# Patient Record
Sex: FEMALE | Race: WHITE | NOT HISPANIC OR LATINO
[De-identification: names, ages, dates, MRNs, and addresses within clinical notes are randomized per-mention and may not be internally consistent; named-entity substitution may affect disease eponyms.]

---

## 2022-05-26 PROBLEM — Z00.00 ENCOUNTER FOR PREVENTIVE HEALTH EXAMINATION: Status: ACTIVE | Noted: 2022-05-26

## 2022-06-17 ENCOUNTER — NON-APPOINTMENT (OUTPATIENT)
Age: 22
End: 2022-06-17

## 2022-06-22 ENCOUNTER — APPOINTMENT (OUTPATIENT)
Dept: OBGYN | Facility: CLINIC | Age: 22
End: 2022-06-22
Payer: COMMERCIAL

## 2022-06-22 VITALS
BODY MASS INDEX: 29.39 KG/M2 | WEIGHT: 140 LBS | DIASTOLIC BLOOD PRESSURE: 68 MMHG | SYSTOLIC BLOOD PRESSURE: 122 MMHG | HEIGHT: 58 IN

## 2022-06-22 PROCEDURE — 99203 OFFICE O/P NEW LOW 30 MIN: CPT

## 2022-06-22 NOTE — REVIEW OF SYSTEMS
[Patient Intake Form Reviewed] : Patient intake form was reviewed [Abn Vaginal bleeding] : abnormal vaginal bleeding [Negative] : Heme/Lymph [de-identified] : increased hair growth on chin and facial acne

## 2022-06-22 NOTE — COUNSELING
[Nutrition/ Exercise/ Weight Management] : nutrition, exercise, weight management [Drugs/Alcohol] : drugs, alcohol [Contraception/ Emergency Contraception/ Safe Sexual Practices] : contraception, emergency contraception, safe sexual practices [Confidentiality] : confidentiality [STD (testing, results, tx)] : STD (testing, results, tx) [Lab Results] : lab results [Other ___] : [unfilled]

## 2022-06-22 NOTE — PLAN
[FreeTextEntry1] : 20 y/o G0 new pt  c/o Hormonal Acre, Irregular Cycles and Hirsutism; Symptoms c/w PCOS (we discussed need for blood work/sono- offered not indicated at this time)\par - Discussed at length PCOS as a syndrome; it's relation to insulin resistance, potential impact on infertility and increased risk of endometrial cancer with unopposed estrogen. A valuation of bloods for FSH, LH, TSH, Free Testosterone, 17-0H Progesterone, and possible glusose tolerance testing were otulined. Discussed metobolic syndrome and importance of weight mgmt and diet modification. Treatment options discussed including OCPs, Provera, Mirena IUD, and Metformin. The GI effects of metformin were discussed. All questions and concerns were addressed.\par -erx Spirinolactone 25 mg po daily for acne/hair growth\par -erx JOSE F ocp to help regulate menses; and also assist with acne/hair growth\par - d/w pt start pap when she becomes sexually active, no need at this time, s/p Gardasil\par \par follow up 6 months to eval meds. \par \par During this visit 30 minutes were spent face-to-face with greater than 50% of this time dedicated to counseling.\par \par \par \par

## 2022-06-22 NOTE — HISTORY OF PRESENT ILLNESS
[Frequency: Q ___ days] : menstrual periods occur approximately every [unfilled] days [Menarche Age: ____] : age at menarche was [unfilled] [No] : Patient does not have concerns regarding sex [Never active] : never active [TextBox_31] : does not need; never sexually active [TextBox_6] : 6/4/22 [TextBox_9] : 10 [TextBox_13] : 98-46 [TextBox_15] : 4-7 [Irregular Menstrual Interval] : irregular menstrual interval [Moderate Bleeding] : moderate bleeding [FreeTextEntry1] : 6/4/22

## 2022-06-22 NOTE — PHYSICAL EXAM
[Chaperone Declined] : Patient declined chaperone [Appropriately responsive] : appropriately responsive [Alert] : alert [No Acute Distress] : no acute distress [No Lymphadenopathy] : no lymphadenopathy

## 2022-06-22 NOTE — REASON FOR VISIT
[Initial] : an initial consultation for [Abnormal Uterine Bleeding] : abnormal uterine bleeding [Other: _____] : [unfilled] [FreeTextEntry2] : hormonal concerns [FreeTextEntry1] : PCP; [facility]

## 2022-10-03 RX ORDER — DROSPIRENONE AND ETHINYL ESTRADIOL 0.02-3(28)
3-0.02 KIT ORAL DAILY
Qty: 3 | Refills: 0 | Status: ACTIVE | COMMUNITY
Start: 2022-06-22 | End: 1900-01-01

## 2022-11-02 ENCOUNTER — NON-APPOINTMENT (OUTPATIENT)
Age: 22
End: 2022-11-02

## 2022-12-08 ENCOUNTER — APPOINTMENT (OUTPATIENT)
Dept: OBGYN | Facility: CLINIC | Age: 22
End: 2022-12-08
Payer: COMMERCIAL

## 2022-12-08 ENCOUNTER — TRANSCRIPTION ENCOUNTER (OUTPATIENT)
Age: 22
End: 2022-12-08

## 2022-12-08 VITALS
HEIGHT: 58 IN | BODY MASS INDEX: 27.92 KG/M2 | DIASTOLIC BLOOD PRESSURE: 79 MMHG | WEIGHT: 133 LBS | SYSTOLIC BLOOD PRESSURE: 109 MMHG

## 2022-12-08 DIAGNOSIS — E28.8 OTHER OVARIAN DYSFUNCTION: ICD-10-CM

## 2022-12-08 DIAGNOSIS — L70.0 ACNE VULGARIS: ICD-10-CM

## 2022-12-08 DIAGNOSIS — E28.2 POLYCYSTIC OVARIAN SYNDROME: ICD-10-CM

## 2022-12-08 DIAGNOSIS — N92.6 IRREGULAR MENSTRUATION, UNSPECIFIED: ICD-10-CM

## 2022-12-08 PROCEDURE — 99213 OFFICE O/P EST LOW 20 MIN: CPT

## 2022-12-08 NOTE — HISTORY OF PRESENT ILLNESS
[FreeTextEntry1] : 23 y/o G0 LMP 11/27/22 presents for follow up of oligomenorrhea, facial hair growth and acne. Most likely PCOS (no bloods or sono). Pt very happy with improvement in cane since starting spirinolactone 25mg , some improvement in chin hair growth. Reports that her cycles since starting JOSE F in June have been extremely regular and would like to continue. BP normal today. No other complaints or concerns. \par \par NKDA\par no med hx, no surgical hx\par Never sexually active. \par GYN: s/p Gardasil vaccine; PCOS

## 2022-12-08 NOTE — COUNSELING
[Contraception/ Emergency Contraception/ Safe Sexual Practices] : contraception, emergency contraception, safe sexual practices [Confidentiality] : confidentiality [Medication Management] : medication management

## 2022-12-08 NOTE — PHYSICAL EXAM
[Chaperone Declined] : Patient declined chaperone [Appropriately responsive] : appropriately responsive [Alert] : alert [No Acute Distress] : no acute distress [Oriented x3] : oriented x3

## 2023-01-16 ENCOUNTER — RX RENEWAL (OUTPATIENT)
Age: 23
End: 2023-01-16

## 2023-04-28 ENCOUNTER — RX RENEWAL (OUTPATIENT)
Age: 23
End: 2023-04-28

## 2023-04-28 RX ORDER — SPIRONOLACTONE 25 MG/1
25 TABLET ORAL DAILY
Qty: 90 | Refills: 0 | Status: ACTIVE | COMMUNITY
Start: 2022-06-22 | End: 1900-01-01

## 2023-05-12 NOTE — PLAN
[FreeTextEntry1] : follow up for PCOS symtoms\par -continue JOSE F, erx placed. Oral contraceptive counseling provided to the patient.  Discussed risks and benefits, including thromboembolic events, especially in the setting of smoking, or in the setting of pre-existing medical conditions that predispose to adverse cardiovascular events.  Benign side effects reviewed as well as risk of unintended pregnancy.   Discussion regarding decreased contraceptive efficacy when taken with certain medications or when dosing schedule is erratic.  They do not protect against STI's. All questions answered.\par -continue spirinolactone 25 mg; discussed with patient about possible increasing dose to 50mg if she notices increased acne/hair growth; also advised to f/u derm to consider topicals. \par \par RTO 1 yr or sooner as needed. \par pap when she becomes sexually active; aware she should have yearly wellness exams.\par \par During this visit 20 minutes was spent face-to-face with greater than 50% of the time dedicated to counseling.\par \par 
None

## 2023-06-05 RX ORDER — DROSPIRENONE AND ETHINYL ESTRADIOL 0.02-3(28)
3-0.02 KIT ORAL DAILY
Qty: 1 | Refills: 0 | Status: ACTIVE | COMMUNITY
Start: 2022-12-08 | End: 1900-01-01

## 2023-06-26 ENCOUNTER — APPOINTMENT (OUTPATIENT)
Dept: OBGYN | Facility: CLINIC | Age: 23
End: 2023-06-26

## 2024-05-06 ENCOUNTER — NON-APPOINTMENT (OUTPATIENT)
Age: 24
End: 2024-05-06

## 2024-06-10 ENCOUNTER — ASOB RESULT (OUTPATIENT)
Age: 24
End: 2024-06-10

## 2024-06-10 ENCOUNTER — APPOINTMENT (OUTPATIENT)
Dept: ANTEPARTUM | Facility: CLINIC | Age: 24
End: 2024-06-10
Payer: COMMERCIAL

## 2024-06-10 PROCEDURE — 76805 OB US >/= 14 WKS SNGL FETUS: CPT

## 2024-06-26 ENCOUNTER — APPOINTMENT (OUTPATIENT)
Dept: ANTEPARTUM | Facility: CLINIC | Age: 24
End: 2024-06-26
Payer: COMMERCIAL

## 2024-06-26 ENCOUNTER — ASOB RESULT (OUTPATIENT)
Age: 24
End: 2024-06-26

## 2024-06-26 PROCEDURE — 76816 OB US FOLLOW-UP PER FETUS: CPT

## 2024-08-28 ENCOUNTER — APPOINTMENT (OUTPATIENT)
Dept: ANTEPARTUM | Facility: CLINIC | Age: 24
End: 2024-08-28

## 2024-08-28 ENCOUNTER — ASOB RESULT (OUTPATIENT)
Age: 24
End: 2024-08-28

## 2024-08-28 PROCEDURE — 76816 OB US FOLLOW-UP PER FETUS: CPT

## 2024-10-28 ENCOUNTER — APPOINTMENT (OUTPATIENT)
Dept: ANTEPARTUM | Facility: CLINIC | Age: 24
End: 2024-10-28
Payer: COMMERCIAL

## 2024-10-28 ENCOUNTER — INPATIENT (INPATIENT)
Facility: HOSPITAL | Age: 24
LOS: 2 days | Discharge: HOME | DRG: 951 | End: 2024-10-31
Attending: OBSTETRICS & GYNECOLOGY | Admitting: OBSTETRICS & GYNECOLOGY
Payer: COMMERCIAL

## 2024-10-28 ENCOUNTER — ASOB RESULT (OUTPATIENT)
Age: 24
End: 2024-10-28

## 2024-10-28 VITALS
HEART RATE: 114 BPM | WEIGHT: 149.03 LBS | TEMPERATURE: 99 F | HEIGHT: 58 IN | OXYGEN SATURATION: 99 % | RESPIRATION RATE: 18 BRPM | DIASTOLIC BLOOD PRESSURE: 83 MMHG | SYSTOLIC BLOOD PRESSURE: 124 MMHG

## 2024-10-28 DIAGNOSIS — O26.899 OTHER SPECIFIED PREGNANCY RELATED CONDITIONS, UNSPECIFIED TRIMESTER: ICD-10-CM

## 2024-10-28 DIAGNOSIS — Z34.80 ENCOUNTER FOR SUPERVISION OF OTHER NORMAL PREGNANCY, UNSPECIFIED TRIMESTER: ICD-10-CM

## 2024-10-28 LAB
BASOPHILS # BLD AUTO: 0.04 K/UL — SIGNIFICANT CHANGE UP (ref 0–0.2)
BASOPHILS NFR BLD AUTO: 0.3 % — SIGNIFICANT CHANGE UP (ref 0–2)
BLD GP AB SCN SERPL QL: NEGATIVE — SIGNIFICANT CHANGE UP
EOSINOPHIL # BLD AUTO: 0.01 K/UL — SIGNIFICANT CHANGE UP (ref 0–0.5)
EOSINOPHIL NFR BLD AUTO: 0.1 % — SIGNIFICANT CHANGE UP (ref 0–6)
HCT VFR BLD CALC: 36 % — SIGNIFICANT CHANGE UP (ref 34.5–45)
HGB BLD-MCNC: 11.6 G/DL — SIGNIFICANT CHANGE UP (ref 11.5–15.5)
IMM GRANULOCYTES NFR BLD AUTO: 1.2 % — HIGH (ref 0–0.9)
LYMPHOCYTES # BLD AUTO: 25.6 % — SIGNIFICANT CHANGE UP (ref 13–44)
LYMPHOCYTES # BLD AUTO: 3.25 K/UL — SIGNIFICANT CHANGE UP (ref 1–3.3)
MCHC RBC-ENTMCNC: 28.2 PG — SIGNIFICANT CHANGE UP (ref 27–34)
MCHC RBC-ENTMCNC: 32.2 GM/DL — SIGNIFICANT CHANGE UP (ref 32–36)
MCV RBC AUTO: 87.4 FL — SIGNIFICANT CHANGE UP (ref 80–100)
MONOCYTES # BLD AUTO: 0.81 K/UL — SIGNIFICANT CHANGE UP (ref 0–0.9)
MONOCYTES NFR BLD AUTO: 6.4 % — SIGNIFICANT CHANGE UP (ref 2–14)
NEUTROPHILS # BLD AUTO: 8.44 K/UL — HIGH (ref 1.8–7.4)
NEUTROPHILS NFR BLD AUTO: 66.4 % — SIGNIFICANT CHANGE UP (ref 43–77)
NRBC # BLD: 0 /100 WBCS — SIGNIFICANT CHANGE UP (ref 0–0)
PLATELET # BLD AUTO: 212 K/UL — SIGNIFICANT CHANGE UP (ref 150–400)
RBC # BLD: 4.12 M/UL — SIGNIFICANT CHANGE UP (ref 3.8–5.2)
RBC # FLD: 13.4 % — SIGNIFICANT CHANGE UP (ref 10.3–14.5)
RH IG SCN BLD-IMP: POSITIVE — SIGNIFICANT CHANGE UP
WBC # BLD: 12.7 K/UL — HIGH (ref 3.8–10.5)
WBC # FLD AUTO: 12.7 K/UL — HIGH (ref 3.8–10.5)

## 2024-10-28 PROCEDURE — 76816 OB US FOLLOW-UP PER FETUS: CPT

## 2024-10-28 PROCEDURE — 76819 FETAL BIOPHYS PROFIL W/O NST: CPT | Mod: 59

## 2024-10-28 RX ORDER — OXYTOCIN IN D5W-0.2% SODIUM CL 15/250 ML
167 PLASTIC BAG, INJECTION (ML) INTRAVENOUS
Qty: 30 | Refills: 0 | Status: DISCONTINUED | OUTPATIENT
Start: 2024-10-28 | End: 2024-10-31

## 2024-10-28 RX ORDER — AMPICILLIN 2 G/1
1 INJECTION, POWDER, FOR SOLUTION INTRAVENOUS EVERY 4 HOURS
Refills: 0 | Status: DISCONTINUED | OUTPATIENT
Start: 2024-10-28 | End: 2024-10-29

## 2024-10-28 RX ORDER — CITRIC ACID/SODIUM CITRATE 334-500MG
15 SOLUTION, ORAL ORAL EVERY 6 HOURS
Refills: 0 | Status: DISCONTINUED | OUTPATIENT
Start: 2024-10-28 | End: 2024-10-29

## 2024-10-28 RX ORDER — CHLORHEXIDINE GLUCONATE 40 MG/ML
1 SOLUTION TOPICAL DAILY
Refills: 0 | Status: DISCONTINUED | OUTPATIENT
Start: 2024-10-28 | End: 2024-10-29

## 2024-10-28 RX ORDER — AMPICILLIN 2 G/1
2 INJECTION, POWDER, FOR SOLUTION INTRAVENOUS ONCE
Refills: 0 | Status: COMPLETED | OUTPATIENT
Start: 2024-10-28 | End: 2024-10-28

## 2024-10-28 RX ADMIN — Medication 125 MILLILITER(S): at 11:10

## 2024-10-28 RX ADMIN — AMPICILLIN 108 GRAM(S): 2 INJECTION, POWDER, FOR SOLUTION INTRAVENOUS at 15:23

## 2024-10-28 RX ADMIN — Medication 1000 MILLILITER(S): at 10:58

## 2024-10-28 RX ADMIN — AMPICILLIN 108 GRAM(S): 2 INJECTION, POWDER, FOR SOLUTION INTRAVENOUS at 23:22

## 2024-10-28 RX ADMIN — AMPICILLIN 200 GRAM(S): 2 INJECTION, POWDER, FOR SOLUTION INTRAVENOUS at 11:14

## 2024-10-28 RX ADMIN — AMPICILLIN 108 GRAM(S): 2 INJECTION, POWDER, FOR SOLUTION INTRAVENOUS at 19:20

## 2024-10-28 NOTE — OB PROVIDER LABOR PROGRESS NOTE - NS_SUBJECTIVE/OBJECTIVE_OBGYN_ALL_OB_FT
PA Note:  Patient seen and evaluated at bedside for placement of cervical balloon.    Cervical balloon placed without incident. 30cc of NS filled uterine balloon.

## 2024-10-28 NOTE — OB PROVIDER LABOR PROGRESS NOTE - ASSESSMENT
A/P:  -EFM/[place]  -Continue IOL with buccal cytotec and CB  -C/w Ampicillin for gbs ppx  -Anticipate   Plan per Dr. Kendrick Shi PA-C

## 2024-10-28 NOTE — OB PROVIDER H&P - NSLOWPPHRISK_OBGYN_A_OB
No previous uterine incision/[place] Pregnancy/Less than or equal to 4 previous vaginal births/No known bleeding disorder/No history of postpartum hemorrhage/No other PPH risks indicated

## 2024-10-28 NOTE — OB PROVIDER H&P - HISTORY OF PRESENT ILLNESS
PA Note:  24y  @40wks and 3 days gestation presenting for IOL due to oligohydramnios with an HEAVEN 4.3 found on ultrasound today. Patient denies ctx, LOF or VB. PNC c/b IUGR also noted on ultrasound today with EFW 6#6 (overall 4%ile, AC <1%ile, dopplers WNL), otherwise uncomplicated. +FM. GBS +.     POBHx: Denies  PGYNhx: Denies fibroids, ovarian cysts, abnormal pap smears, STD's  PMHx: Denies  Medications: PNV  Allergies: NKDA  PSHx: Denies  Social Hx: Denies etoh/tobacco/drug use. Denies anxiety/depression    Vital Signs Last 24 Hrs  T(C): 37.1 (28 Oct 2024 10:24), Max: 37.1 (28 Oct 2024 10:24)  T(F): 98.8 (28 Oct 2024 10:24), Max: 98.8 (28 Oct 2024 10:24)  HR: 114 (28 Oct 2024 10:24) (114 - 114)  BP: 124/83 (28 Oct 2024 10:24) (124/83 - 124/83)  BP(mean): --  RR: 18 (28 Oct 2024 10:24) (18 - 18)  SpO2: 99% (28 Oct 2024 10:24) (99% - 99%)    Parameters below as of 28 Oct 2024 10:24  Patient On (Oxygen Delivery Method): room air    General: NAD, A&Ox3  CV: RRR  Lungs: CTA b/l  Abdomen: Soft, NT, gravid    VE: /-3 (examined by Dr. Marks in office)  Bedside sono: vertex presentation  EFM: 145/moderate variability/+accels/no decels  [place]: Irregular

## 2024-10-28 NOTE — OB RN PATIENT PROFILE - FLU SEASON?
Yes... Sotyktu Counseling:  I discussed the most common side effects of Sotyktu including: common cold, sore throat, sinus infections, cold sores, canker sores, folliculitis, and acne.  I also discussed more serious side effects of Sotyktu including but not limited to: serious allergic reactions; increased risk for infections such as TB; cancers such as lymphomas; rhabdomyolysis and elevated CPK; and elevated triglycerides and liver enzymes.

## 2024-10-28 NOTE — OB PROVIDER H&P - NSHPPHYSICALEXAM_GEN_ALL_CORE
Vital Signs Last 24 Hrs  T(C): 37.1 (28 Oct 2024 10:24), Max: 37.1 (28 Oct 2024 10:24)  T(F): 98.8 (28 Oct 2024 10:24), Max: 98.8 (28 Oct 2024 10:24)  HR: 114 (28 Oct 2024 10:24) (114 - 114)  BP: 124/83 (28 Oct 2024 10:24) (124/83 - 124/83)  BP(mean): --  RR: 18 (28 Oct 2024 10:24) (18 - 18)  SpO2: 99% (28 Oct 2024 10:24) (99% - 99%)    Parameters below as of 28 Oct 2024 10:24  Patient On (Oxygen Delivery Method): room air    General: NAD, A&Ox3  CV: RRR  Lungs: CTA b/l  Abdomen: Soft, NT, gravid

## 2024-10-28 NOTE — OB PROVIDER H&P - ASSESSMENT
A/P:  24y  @40wks and 3 days gestation presenting for IOL for oligohydramnios (HEAVEN 4.3) and IUGR (overall 4%ile). +FM. GBS +. EFW [id number]  -Admit to L&D  -Routine labs  -EFM/[place]  -NPO, IVF, Bicitra  -IOL with buccal cytotec and cervical balloon  -Ampicillin for gbs ppx  -Anesthesia consult  -Anticipate   Plan per Dr. Kendrick Shi PA-C

## 2024-10-29 LAB — T PALLIDUM AB TITR SER: NEGATIVE — SIGNIFICANT CHANGE UP

## 2024-10-29 PROCEDURE — 88307 TISSUE EXAM BY PATHOLOGIST: CPT | Mod: 26

## 2024-10-29 RX ORDER — MAGNESIUM HYDROXIDE 1200 MG/15ML
30 SUSPENSION ORAL
Refills: 0 | Status: DISCONTINUED | OUTPATIENT
Start: 2024-10-29 | End: 2024-10-31

## 2024-10-29 RX ORDER — IBUPROFEN 200 MG
600 TABLET ORAL EVERY 6 HOURS
Refills: 0 | Status: DISCONTINUED | OUTPATIENT
Start: 2024-10-29 | End: 2024-10-29

## 2024-10-29 RX ORDER — MODIFIED LANOLIN
1 OINTMENT (GRAM) TOPICAL EVERY 6 HOURS
Refills: 0 | Status: DISCONTINUED | OUTPATIENT
Start: 2024-10-29 | End: 2024-10-31

## 2024-10-29 RX ORDER — IBUPROFEN 200 MG
600 TABLET ORAL EVERY 6 HOURS
Refills: 0 | Status: COMPLETED | OUTPATIENT
Start: 2024-10-29 | End: 2025-09-27

## 2024-10-29 RX ORDER — OXYTOCIN IN D5W-0.2% SODIUM CL 15/250 ML
167 PLASTIC BAG, INJECTION (ML) INTRAVENOUS
Qty: 30 | Refills: 0 | Status: DISCONTINUED | OUTPATIENT
Start: 2024-10-29 | End: 2024-10-31

## 2024-10-29 RX ORDER — SIMETHICONE 80 MG/1
80 TABLET, CHEWABLE ORAL EVERY 4 HOURS
Refills: 0 | Status: DISCONTINUED | OUTPATIENT
Start: 2024-10-29 | End: 2024-10-31

## 2024-10-29 RX ORDER — CLOSTRIDIUM TETANI TOXOID ANTIGEN (FORMALDEHYDE INACTIVATED), CORYNEBACTERIUM DIPHTHERIAE TOXOID ANTIGEN (FORMALDEHYDE INACTIVATED), BORDETELLA PERTUSSIS TOXOID ANTIGEN (GLUTARALDEHYDE INACTIVATED), BORDETELLA PERTUSSIS FILAMENTOUS HEMAGGLUTININ ANTIGEN (FORMALDEHYDE INACTIVATED), BORDETELLA PERTUSSIS PERTACTIN ANTIGEN, AND BORDETELLA PERTUSSIS FIMBRIAE 2/3 ANTIGEN 5; 2; 2.5; 5; 3; 5 [LF]/.5ML; [LF]/.5ML; UG/.5ML; UG/.5ML; UG/.5ML; UG/.5ML
0.5 INJECTION, SUSPENSION INTRAMUSCULAR ONCE
Refills: 0 | Status: DISCONTINUED | OUTPATIENT
Start: 2024-10-29 | End: 2024-10-31

## 2024-10-29 RX ORDER — SODIUM CHLORIDE 9 MG/ML
3 INJECTION, SOLUTION INTRAMUSCULAR; INTRAVENOUS; SUBCUTANEOUS EVERY 8 HOURS
Refills: 0 | Status: DISCONTINUED | OUTPATIENT
Start: 2024-10-29 | End: 2024-10-31

## 2024-10-29 RX ORDER — BENZOCAINE 200 MG/G
1 GEL ORAL EVERY 6 HOURS
Refills: 0 | Status: DISCONTINUED | OUTPATIENT
Start: 2024-10-29 | End: 2024-10-31

## 2024-10-29 RX ORDER — TRANEXAMIC ACID 650 MG/1
1000 TABLET ORAL ONCE
Refills: 0 | Status: COMPLETED | OUTPATIENT
Start: 2024-10-29 | End: 2024-10-29

## 2024-10-29 RX ORDER — HYDROCORTISONE 1 %
1 OINTMENT (GRAM) TOPICAL EVERY 6 HOURS
Refills: 0 | Status: DISCONTINUED | OUTPATIENT
Start: 2024-10-29 | End: 2024-10-31

## 2024-10-29 RX ORDER — DIBUCAINE 1 %
1 OINTMENT (GRAM) TOPICAL EVERY 6 HOURS
Refills: 0 | Status: DISCONTINUED | OUTPATIENT
Start: 2024-10-29 | End: 2024-10-31

## 2024-10-29 RX ORDER — IBUPROFEN 200 MG
600 TABLET ORAL EVERY 6 HOURS
Refills: 0 | Status: DISCONTINUED | OUTPATIENT
Start: 2024-10-29 | End: 2024-10-31

## 2024-10-29 RX ORDER — KETOROLAC TROMETHAMINE 30 MG/ML
30 INJECTION INTRAMUSCULAR; INTRAVENOUS ONCE
Refills: 0 | Status: DISCONTINUED | OUTPATIENT
Start: 2024-10-29 | End: 2024-10-29

## 2024-10-29 RX ORDER — ACETAMINOPHEN 500 MG
975 TABLET ORAL EVERY 6 HOURS
Refills: 0 | Status: DISCONTINUED | OUTPATIENT
Start: 2024-10-29 | End: 2024-10-31

## 2024-10-29 RX ORDER — DIPHENHYDRAMINE HCL 12.5MG/5ML
25 ELIXIR ORAL EVERY 6 HOURS
Refills: 0 | Status: DISCONTINUED | OUTPATIENT
Start: 2024-10-29 | End: 2024-10-31

## 2024-10-29 RX ORDER — ACETAMINOPHEN 500 MG
975 TABLET ORAL
Refills: 0 | Status: DISCONTINUED | OUTPATIENT
Start: 2024-10-29 | End: 2024-10-29

## 2024-10-29 RX ORDER — PRENATAL VIT/IRON FUM/FOLIC AC 60 MG-1 MG
1 TABLET ORAL DAILY
Refills: 0 | Status: DISCONTINUED | OUTPATIENT
Start: 2024-10-29 | End: 2024-10-31

## 2024-10-29 RX ORDER — PRAMOXINE HCL 1 %
1 CREAM (GRAM) RECTAL EVERY 4 HOURS
Refills: 0 | Status: DISCONTINUED | OUTPATIENT
Start: 2024-10-29 | End: 2024-10-31

## 2024-10-29 RX ORDER — OXYTOCIN IN D5W-0.2% SODIUM CL 15/250 ML
4 PLASTIC BAG, INJECTION (ML) INTRAVENOUS
Qty: 30 | Refills: 0 | Status: DISCONTINUED | OUTPATIENT
Start: 2024-10-29 | End: 2024-10-29

## 2024-10-29 RX ORDER — OXYCODONE HYDROCHLORIDE 30 MG/1
5 TABLET ORAL
Refills: 0 | Status: DISCONTINUED | OUTPATIENT
Start: 2024-10-29 | End: 2024-10-31

## 2024-10-29 RX ORDER — OXYCODONE HYDROCHLORIDE 30 MG/1
5 TABLET ORAL ONCE
Refills: 0 | Status: DISCONTINUED | OUTPATIENT
Start: 2024-10-29 | End: 2024-10-31

## 2024-10-29 RX ADMIN — Medication 975 MILLIGRAM(S): at 12:39

## 2024-10-29 RX ADMIN — TRANEXAMIC ACID 220 MILLIGRAM(S): 650 TABLET ORAL at 06:35

## 2024-10-29 RX ADMIN — KETOROLAC TROMETHAMINE 30 MILLIGRAM(S): 30 INJECTION INTRAMUSCULAR; INTRAVENOUS at 07:52

## 2024-10-29 RX ADMIN — Medication 600 MILLIGRAM(S): at 15:50

## 2024-10-29 RX ADMIN — Medication 1000 MILLILITER(S): at 03:40

## 2024-10-29 RX ADMIN — Medication 975 MILLIGRAM(S): at 23:53

## 2024-10-29 RX ADMIN — AMPICILLIN 108 GRAM(S): 2 INJECTION, POWDER, FOR SOLUTION INTRAVENOUS at 03:20

## 2024-10-29 RX ADMIN — Medication 600 MILLIGRAM(S): at 21:00

## 2024-10-29 RX ADMIN — Medication 600 MILLIGRAM(S): at 14:52

## 2024-10-29 RX ADMIN — Medication 975 MILLIGRAM(S): at 18:17

## 2024-10-29 RX ADMIN — Medication 600 MILLIGRAM(S): at 20:26

## 2024-10-29 RX ADMIN — Medication 4 MILLIUNIT(S)/MIN: at 04:39

## 2024-10-29 RX ADMIN — Medication 975 MILLIGRAM(S): at 13:35

## 2024-10-29 NOTE — OB RN DELIVERY SUMMARY - NS_SEPSISRSKCALC_OBGYN_ALL_OB_FT
EOS calculated successfully. EOS Risk Factor: 0.5/1000 live births ([facility][note 3] national incidence); GA=40w4d; Temp=98.8; ROM=1.667; GBS='Positive'; Antibiotics='Broad spectrum antibiotics 2-3.9 hrs prior to birth'

## 2024-10-29 NOTE — OB NEONATOLOGY/PEDIATRICIAN DELIVERY SUMMARY - NSPEDSNEONOTESA_OBGYN_ALL_OB_FT
Requested to attend [facility] for a non reassuring fetal heart tracing at 40 4/7 weeks gestation. Mother is a 24 year old  with prenatal labs neg, NR and immune, GBS pos and treated with Amp x 5, blood type O pos. This was an induction of labor for oligohydramnios and IUGR. SROM 1 hour prior to delivery with clear fluid. Vacuum applied infant emerged cephalic with good tone and cry- delayed cord clamping x 30 seconds and placed skin to skin with mother. Apgars 9/9, EOS 0.11.

## 2024-10-29 NOTE — OB RN DELIVERY SUMMARY - NSSELHIDDEN_OBGYN_ALL_OB_FT
[NS_DeliveryAttending1_OBGYN_ALL_OB_FT:[email or web address]=],[NS_DeliveryRN_OBGYN_ALL_OB_FT:[id number]=]

## 2024-10-29 NOTE — OB PROVIDER DELIVERY SUMMARY - NSSELHIDDEN_OBGYN_ALL_OB_FT
[NS_DeliveryAttending1_OBGYN_ALL_OB_FT:[email or web address]=],[NS_DeliveryRN_OBGYN_ALL_OB_FT:[id number]=]

## 2024-10-29 NOTE — OB PROVIDER DELIVERY SUMMARY - NSPROVIDERDELIVERYNOTE_OBGYN_ALL_OB_FT
vavd at 40 weeks 4 days, NTSV, viable female infant apgar 9/9 pklacenta complete and uterus explored. second degree laceration. ebl 600 cc. the fetal heart tracing became nonreassuring, with an increase in baseline to 170 and deep decelerations, so vacuum was performed from OP+2 over four contractions. second degree laceration including a right sulcus tear. ebl 600 cc

## 2024-10-30 LAB
HCT VFR BLD CALC: 27.8 % — LOW (ref 34.5–45)
HGB BLD-MCNC: 8.5 G/DL — LOW (ref 11.5–15.5)
MCHC RBC-ENTMCNC: 28.2 PG — SIGNIFICANT CHANGE UP (ref 27–34)
MCHC RBC-ENTMCNC: 30.6 G/DL — LOW (ref 32–36)
MCV RBC AUTO: 92.4 FL — SIGNIFICANT CHANGE UP (ref 80–100)
NRBC # BLD: 0 /100 WBCS — SIGNIFICANT CHANGE UP (ref 0–0)
PLATELET # BLD AUTO: 180 K/UL — SIGNIFICANT CHANGE UP (ref 150–400)
RBC # BLD: 3.01 M/UL — LOW (ref 3.8–5.2)
RBC # FLD: 13.6 % — SIGNIFICANT CHANGE UP (ref 10.3–14.5)
WBC # BLD: 13.77 K/UL — HIGH (ref 3.8–10.5)
WBC # FLD AUTO: 13.77 K/UL — HIGH (ref 3.8–10.5)

## 2024-10-30 RX ORDER — FERROUS SULFATE 325(65) MG
300 TABLET ORAL ONCE
Refills: 0 | Status: COMPLETED | OUTPATIENT
Start: 2024-10-30 | End: 2024-10-31

## 2024-10-30 RX ORDER — ASCORBIC ACID 500 MG
500 TABLET ORAL THREE TIMES A DAY
Refills: 0 | Status: DISCONTINUED | OUTPATIENT
Start: 2024-10-30 | End: 2024-10-30

## 2024-10-30 RX ORDER — FERROUS SULFATE 325(65) MG
325 TABLET ORAL THREE TIMES A DAY
Refills: 0 | Status: DISCONTINUED | OUTPATIENT
Start: 2024-10-30 | End: 2024-10-31

## 2024-10-30 RX ORDER — FERROUS SULFATE 325(65) MG
325 TABLET ORAL THREE TIMES A DAY
Refills: 0 | Status: DISCONTINUED | OUTPATIENT
Start: 2024-10-30 | End: 2024-10-30

## 2024-10-30 RX ADMIN — Medication 975 MILLIGRAM(S): at 06:05

## 2024-10-30 RX ADMIN — Medication 600 MILLIGRAM(S): at 15:27

## 2024-10-30 RX ADMIN — Medication 975 MILLIGRAM(S): at 18:51

## 2024-10-30 RX ADMIN — SODIUM CHLORIDE 3 MILLILITER(S): 9 INJECTION, SOLUTION INTRAMUSCULAR; INTRAVENOUS; SUBCUTANEOUS at 06:36

## 2024-10-30 RX ADMIN — Medication 600 MILLIGRAM(S): at 20:51

## 2024-10-30 RX ADMIN — Medication 975 MILLIGRAM(S): at 23:42

## 2024-10-30 RX ADMIN — Medication 975 MILLIGRAM(S): at 12:41

## 2024-10-30 RX ADMIN — Medication 975 MILLIGRAM(S): at 05:05

## 2024-10-30 RX ADMIN — Medication 975 MILLIGRAM(S): at 12:11

## 2024-10-30 RX ADMIN — SODIUM CHLORIDE 3 MILLILITER(S): 9 INJECTION, SOLUTION INTRAMUSCULAR; INTRAVENOUS; SUBCUTANEOUS at 00:00

## 2024-10-30 RX ADMIN — Medication 975 MILLIGRAM(S): at 00:53

## 2024-10-30 RX ADMIN — Medication 600 MILLIGRAM(S): at 21:45

## 2024-10-30 RX ADMIN — Medication 600 MILLIGRAM(S): at 09:42

## 2024-10-30 RX ADMIN — Medication 600 MILLIGRAM(S): at 02:36

## 2024-10-30 RX ADMIN — Medication 975 MILLIGRAM(S): at 18:21

## 2024-10-30 RX ADMIN — Medication 600 MILLIGRAM(S): at 03:36

## 2024-10-30 RX ADMIN — Medication 600 MILLIGRAM(S): at 10:12

## 2024-10-30 RX ADMIN — Medication 600 MILLIGRAM(S): at 15:57

## 2024-10-31 ENCOUNTER — TRANSCRIPTION ENCOUNTER (OUTPATIENT)
Age: 24
End: 2024-10-31

## 2024-10-31 VITALS
DIASTOLIC BLOOD PRESSURE: 69 MMHG | HEART RATE: 91 BPM | SYSTOLIC BLOOD PRESSURE: 105 MMHG | TEMPERATURE: 98 F | OXYGEN SATURATION: 99 % | RESPIRATION RATE: 18 BRPM

## 2024-10-31 DIAGNOSIS — D62 ACUTE POSTHEMORRHAGIC ANEMIA: ICD-10-CM

## 2024-10-31 PROCEDURE — 86780 TREPONEMA PALLIDUM: CPT

## 2024-10-31 PROCEDURE — 86901 BLOOD TYPING SEROLOGIC RH(D): CPT

## 2024-10-31 PROCEDURE — 59050 FETAL MONITOR W/REPORT: CPT

## 2024-10-31 PROCEDURE — 85025 COMPLETE CBC W/AUTO DIFF WBC: CPT

## 2024-10-31 PROCEDURE — 85027 COMPLETE CBC AUTOMATED: CPT

## 2024-10-31 PROCEDURE — 88307 TISSUE EXAM BY PATHOLOGIST: CPT

## 2024-10-31 PROCEDURE — 86850 RBC ANTIBODY SCREEN: CPT

## 2024-10-31 PROCEDURE — 59025 FETAL NON-STRESS TEST: CPT

## 2024-10-31 PROCEDURE — 86900 BLOOD TYPING SEROLOGIC ABO: CPT

## 2024-10-31 PROCEDURE — 36415 COLL VENOUS BLD VENIPUNCTURE: CPT

## 2024-10-31 RX ORDER — IBUPROFEN 200 MG
30 TABLET ORAL
Qty: 0 | Refills: 0 | DISCHARGE
Start: 2024-10-31

## 2024-10-31 RX ORDER — ACETAMINOPHEN 500 MG
30.47 TABLET ORAL
Qty: 0 | Refills: 0 | DISCHARGE
Start: 2024-10-31

## 2024-10-31 RX ORDER — PRENATAL VIT/IRON FUM/FOLIC AC 60 MG-1 MG
0 TABLET ORAL
Refills: 0 | DISCHARGE

## 2024-10-31 RX ADMIN — Medication 975 MILLIGRAM(S): at 00:30

## 2024-10-31 RX ADMIN — Medication 975 MILLIGRAM(S): at 13:30

## 2024-10-31 RX ADMIN — Medication 600 MILLIGRAM(S): at 10:40

## 2024-10-31 RX ADMIN — Medication 600 MILLIGRAM(S): at 09:41

## 2024-10-31 RX ADMIN — Medication 300 MILLIGRAM(S): at 03:51

## 2024-10-31 RX ADMIN — Medication 975 MILLIGRAM(S): at 06:26

## 2024-10-31 RX ADMIN — Medication 600 MILLIGRAM(S): at 03:48

## 2024-10-31 RX ADMIN — Medication 600 MILLIGRAM(S): at 15:58

## 2024-10-31 RX ADMIN — Medication 600 MILLIGRAM(S): at 16:55

## 2024-10-31 RX ADMIN — Medication 975 MILLIGRAM(S): at 12:33

## 2024-10-31 RX ADMIN — Medication 600 MILLIGRAM(S): at 04:30

## 2024-10-31 NOTE — PROGRESS NOTE ADULT - ASSESSMENT
A/P:  24y  PPD # 1   S/P   VAVD      with  laceration   midline episiotomy  Doing Well    PMHx:  denies   Current Issues: denies      
24 y.o. G 1 P 1001      PPD # 1 S/P gerardo MARTINEZ anemia due to acute blood loss not requiring blood transfusion, in stable condition

## 2024-10-31 NOTE — DISCHARGE NOTE OB - CARE PROVIDER_API CALL
Kandace Marks  Obstetrics and Gynecology  [address], [address]  [place] [address]  Phone: [phone]  Fax: [phone]  Follow Up Time:

## 2024-10-31 NOTE — DISCHARGE NOTE OB - AVOID SITTING IN ONE POSITION FOR MORE THAN ONE HOUR
6 (moderate pain) Statement Selected Secondary Intention Text (Leave Blank If You Do Not Want): Given the location of the defect, depth of the postoperative wound and inherent tension at the surgical site, healing by secondary intention was deemed most appropriate for wound closure. The risks, benefits, and possible outcomes of this procedure were discussed along with the risks, benefits, and possible outcomes of other options for wound repair (including but not limited to: complex closure, flaps, and grafts). The patient verbalized understanding and voiced a preference for secondary intention over reconstruction. The patient understands that not all healing occurs in predictable fashion or within a predictable time period. If the resulting scar is unsatisfactory, affects/distorts a free margin or other adjacent structures, or is slow to heal the patient understands further surgical intervention or even referral to an additional specialist may be required.

## 2024-10-31 NOTE — DISCHARGE NOTE OB - CARE PLAN
Principal Discharge DX:	Vacuum extractor delivery  Assessment and plan of treatment:	Return to baseline health, regular diet, follow up with dr dsouza   1

## 2024-10-31 NOTE — DISCHARGE NOTE OB - FINANCIAL ASSISTANCE
[facility] provides services at a reduced cost to those who are determined to be eligible through [facility]’s financial assistance program. Information regarding [facility]’s financial assistance program can be found by going to [email or web address] or by calling [phone].

## 2024-10-31 NOTE — PROGRESS NOTE ADULT - PROBLEM SELECTOR PLAN 1
Increase OOB  Regular diet  PO Pain protocol  AM H&H:   Routine Postpartum Care    Anyi De Los Santos PA-C
Cont. PP Care

## 2024-10-31 NOTE — DISCHARGE NOTE OB - PATIENT PORTAL LINK FT
You can access the FollowMyHealth Patient Portal offered by [facility] by registering at the following website: [email or web address]. By joining [employer]’s FollowMyHealth portal, you will also be able to view your health information using other applications (apps) compatible with our system.

## 2024-10-31 NOTE — DISCHARGE NOTE OB - MEDICATION SUMMARY - MEDICATIONS TO TAKE
I will START or STAY ON the medications listed below when I get home from the hospital:    ibuprofen 100 mg/5 mL oral suspension  -- 30 milliliter(s) by mouth every 6 hours  -- Indication: For cramping    acetaminophen 160 mg/5 mL oral suspension  -- 30.47 milliliter(s) by mouth every 6 hours  -- Indication: For mild pain

## 2024-10-31 NOTE — DISCHARGE NOTE OB - NS MD DC FALL RISK RISK
For information on Fall & Injury Prevention, visit: [email or web address] OR  [email or web address] OR  https://www.cdc.gov/steadi/patient.html

## 2024-10-31 NOTE — PROGRESS NOTE ADULT - SUBJECTIVE AND OBJECTIVE BOX
PA PPD#1  Note    Blood Type:  O+              Rubella:  Immune                 RPR:  NR  Pain:  Controlled  Complaints:  None  Pt. is OOB, voiding, passing flatus, & tolerating regular diet.  Lochia:  WNL    VS:  Vital Signs Last 24 Hrs  T(C): 36.6 (31 Oct 2024 05:14), Max: 37 (30 Oct 2024 09:30)  T(F): 97.8 (31 Oct 2024 05:14), Max: 98.6 (30 Oct 2024 09:30)  HR: 91 (31 Oct 2024 05:14) (91 - 94)  BP: 105/69 (31 Oct 2024 05:14) (93/69 - 105/69)  RR: 18 (31 Oct 2024 05:14) (18 - 18)  SpO2: 99% (31 Oct 2024 05:14) (97% - 99%)    Parameters below as of 31 Oct 2024 05:14  Patient On (Oxygen Delivery Method): room air                        8.5    13.77 )-----------( 180      ( 30 Oct 2024 06:57 )             27.8     Abd:  Soft, non-distended, non-tender            Fundus-firm  Laceration-2nd Degree & Sulcal: C/D/I  Extremities:  Edema - none                     Calf Tenderness - none    Assessment:    24 y.o. G 1 P 1001      PPD # 1 S/P VAVD witmarilynn anemia due to acute blood loss not requiring blood transfusion, in stable condition.  PMHx significant for:  None  Current Issues:  Anemia due to acute blood loss not requiring blood transfusion  Plan:  Increase OOB             Cont. Pain Protocol             Cont. Iron Supplementation             Cont. Reg. Diet             Cont. PP [place].    SALINA Gunn
Postpartum Note- PPD#1    Prenatal Labs  Blood type: O Positive  Rubella IgG: Immune  RPR: Negative        S:Patient w/o complaints, pain is controlled.    Pt is OOB, tolerating PO, voiding. Lochia WNL.     O:  Vital Signs Last 24 Hrs  T(C): 36.7 (30 Oct 2024 06:27), Max: 36.9 (29 Oct 2024 08:50)  T(F): 98.1 (30 Oct 2024 06:27), Max: 98.4 (29 Oct 2024 08:50)  HR: 95 (30 Oct 2024 06:27) (95 - 112)  BP: 120/78 (30 Oct 2024 06:27) (94/50 - 120/78)  BP(mean): 68 (29 Oct 2024 09:20) (68 - 76)  RR: 18 (30 Oct 2024 06:27) (18 - 18)  SpO2: 98% (30 Oct 2024 06:27) (95% - 99%)    Parameters below as of 30 Oct 2024 06:27  Patient On (Oxygen Delivery Method): room air         Gen: NAD  Abdomen: Soft, nontender, non-distended, fundus firm.  Vaginal: Lochia WNL,    Ext:  Neg calf tenderness    LABS:    Hemoglobin: 11.6 g/dL (10-28 @ 12:16)      Hematocrit: 36.0 % (10-28 @ 12:16)

## 2024-11-10 LAB — SURGICAL PATHOLOGY STUDY: SIGNIFICANT CHANGE UP
